# Patient Record
Sex: MALE | Race: WHITE | URBAN - METROPOLITAN AREA
[De-identification: names, ages, dates, MRNs, and addresses within clinical notes are randomized per-mention and may not be internally consistent; named-entity substitution may affect disease eponyms.]

---

## 2022-08-16 ENCOUNTER — VIRTUAL VISIT (OUTPATIENT)
Dept: FAMILY MEDICINE CLINIC | Age: 22
End: 2022-08-16

## 2022-08-16 DIAGNOSIS — Z00.00 ENCOUNTER FOR MEDICAL EXAMINATION TO ESTABLISH CARE: Primary | ICD-10-CM

## 2022-08-16 DIAGNOSIS — Z90.89 HISTORY OF TONSILLECTOMY: ICD-10-CM

## 2022-08-16 PROCEDURE — 99203 OFFICE O/P NEW LOW 30 MIN: CPT | Performed by: FAMILY MEDICINE

## 2022-08-16 NOTE — PATIENT INSTRUCTIONS
A Healthy Lifestyle: Care Instructions  Your Care Instructions     A healthy lifestyle can help you feel good, stay at a healthy weight, and have plenty of energy for both work and play. A healthy lifestyle is something you can share with your whole family. A healthy lifestyle also can lower your risk for serious health problems, such as high blood pressure, heart disease, and diabetes. You can follow a few steps listed below to improve your health and the health of your family. Follow-up care is a key part of your treatment and safety. Be sure to make and go to all appointments, and call your doctor if you are having problems. It's also a good idea to know your test results and keep a list of the medicines you take. How can you care for yourself at home? Do not eat too much sugar, fat, or fast foods. You can still have dessert and treats now and then. The goal is moderation. Start small to improve your eating habits. Pay attention to portion sizes, drink less juice and soda pop, and eat more fruits and vegetables. Eat a healthy amount of food. A 3-ounce serving of meat, for example, is about the size of a deck of cards. Fill the rest of your plate with vegetables and whole grains. Limit the amount of soda and sports drinks you have every day. Drink more water when you are thirsty. Eat plenty of fruits and vegetables every day. Have an apple or some carrot sticks as an afternoon snack instead of a candy bar. Try to have fruits and/or vegetables at every meal.  Make exercise part of your daily routine. You may want to start with simple activities, such as walking, bicycling, or slow swimming. Try to be active 30 to 60 minutes every day. You do not need to do all 30 to 60 minutes all at once. For example, you can exercise 3 times a day for 10 or 20 minutes. Moderate exercise is safe for most people, but it is always a good idea to talk to your doctor before starting an exercise program.  Keep moving.  Philip Major the lawn, work in the garden, or clean your house. Take the stairs instead of the elevator at work. If you smoke, quit. People who smoke have an increased risk for heart attack, stroke, cancer, and other lung illnesses. Quitting is hard, but there are ways to boost your chance of quitting tobacco for good. Use nicotine gum, patches, or lozenges. Ask your doctor about stop-smoking programs and medicines. Keep trying. In addition to reducing your risk of diseases in the future, you will notice some benefits soon after you stop using tobacco. If you have shortness of breath or asthma symptoms, they will likely get better within a few weeks after you quit. Limit how much alcohol you drink. Moderate amounts of alcohol (up to 2 drinks a day for men, 1 drink a day for women) are okay. But drinking too much can lead to liver problems, high blood pressure, and other health problems. Family health  If you have a family, there are many things you can do together to improve your health. Eat meals together as a family as often as possible. Eat healthy foods. This includes fruits, vegetables, lean meats and dairy, and whole grains. Include your family in your fitness plan. Most people think of activities such as jogging or tennis as the way to fitness, but there are many ways you and your family can be more active. Anything that makes you breathe hard and gets your heart pumping is exercise. Here are some tips:  Walk to do errands or to take your child to school or the bus. Go for a family bike ride after dinner instead of watching TV. Where can you learn more? Go to http://www.gray.com/  Enter H974 in the search box to learn more about \"A Healthy Lifestyle: Care Instructions. \"  Current as of: June 16, 2021               Content Version: 13.2  © 5890-5097 [employer].    Care instructions adapted under license by [employer] (which disclaims liability or warranty for this information). If you have questions about a medical condition or this instruction, always ask your healthcare professional. [place] [address] any warranty or liability for your use of this information.

## 2022-09-12 ENCOUNTER — TELEPHONE (OUTPATIENT)
Dept: FAMILY MEDICINE CLINIC | Age: 22
End: 2022-09-12

## 2022-09-12 NOTE — TELEPHONE ENCOUNTER
----- Message from Fadi Perez sent at 9/12/2022  1:01 PM EDT -----  Subject: Appointment Request    Reason for Call: Established Patient Appointment needed: Routine Physical   Exam    QUESTIONS    Reason for appointment request? No appointments available during search     Additional Information for Provider? Requesting appt for Yearly Phys.    Please call patient to schedule,.   ---------------------------------------------------------------------------  --------------  [facility] INFO  [phone]; OK to leave message on voicemail  ---------------------------------------------------------------------------  --------------  SCRIPT ANSWERS  COVID Screen: Tila Sheth

## 2022-09-12 NOTE — TELEPHONE ENCOUNTER
Returned pts call, was going to offer cpe appt on Nov 16 @ 8 am w/ Dr. Pilar Villatoro. Unable to lvm due to vm full.

## 2023-01-13 NOTE — PROGRESS NOTES
HISTORY OF PRESENT ILLNESS    Emmanuel Restrepo is a 25 y.o. male is a new patient with cc of penile lesion. He is schedule for penile biopsy. small nonhealing lesion. Will remove in office and bx.it then. Lesion is asymptomatic, no hx std. IPSS  Score: 1    Past Medical History:  PMHx (including negatives):  has no past medical history on file. PSurgHx:  has no past surgical history on file. PSocHx:  reports that he has never smoked. He has never used smokeless tobacco. He reports that he does not currently use alcohol. He reports that he does not currently use drugs after having used the following drugs: Marijuana. Home Medications    Not on File        Chronic Conditions Addressed Today       1. Varicocele - Primary       Review of Systems   Constitutional: Negative. HENT: Negative. Eyes: Negative. Respiratory: Negative. Cardiovascular: Negative. Gastrointestinal: Negative. Genitourinary: Negative. Musculoskeletal: Negative. Skin: Negative. Neurological: Negative. Endo/Heme/Allergies: Negative. Psychiatric/Behavioral: Negative. Patient denies the symptoms of COVID-19 per routine screening guidelines. Physical Exam  General: pleasant, no distress, good eye contact  HEENT: no pallor, no periorbital edema, EOMI  Neck: supple, no thyromegaly, no nodules  Cardiovascular: regular, normal rate, normal S1 and S2,  Respiratory: clear to auscultation bilaterally     Musculoskeletal:  no acanthosis nigricans,no edema,   Neurological:alert and oriented  Psychiatric: normal mood and affect  Skin: color, texture, turgor normal.   Small lesion on penis, will bx and remove  ASSESSMENT and PLAN  Diagnoses and all orders for this visit:    1.  Penile mass  -     AMB POC URINALYSIS DIP STICK AUTO W/O MICRO         for bx and removal, he is aware of risks of bleeding, infection, injury to penis, he has no questions      Emmanuel Restrepo may have a reminder for a \"due or due soon\" health maintenance. The patient has been encouraged to contact their primary care provider for follow-up on this health maintenance or other necessary and/or routine health screening.      Tomer Negrete MD

## 2023-01-16 ENCOUNTER — OFFICE VISIT (OUTPATIENT)
Dept: UROLOGY | Age: 23
End: 2023-01-16
Payer: MEDICAID

## 2023-01-16 VITALS
BODY MASS INDEX: 40.09 KG/M2 | HEIGHT: 70 IN | RESPIRATION RATE: 16 BRPM | WEIGHT: 280 LBS | DIASTOLIC BLOOD PRESSURE: 83 MMHG | SYSTOLIC BLOOD PRESSURE: 130 MMHG | TEMPERATURE: 98 F | OXYGEN SATURATION: 99 % | HEART RATE: 90 BPM

## 2023-01-16 DIAGNOSIS — N48.89 PENILE MASS: Primary | ICD-10-CM

## 2023-01-16 PROBLEM — I86.1 VARICOCELE: Status: ACTIVE | Noted: 2023-01-16

## 2023-01-16 LAB
BILIRUB UR QL: NEGATIVE
GLUCOSE UR-MCNC: NEGATIVE MG/DL
KETONES P FAST UR STRIP-MCNC: NEGATIVE MG/DL
PH UR STRIP: 8 [PH] (ref 4.6–8)
PROT UR QL STRIP: NEGATIVE
SP GR UR STRIP: 1.02 (ref 1–1.03)
UA UROBILINOGEN AMB POC: NORMAL (ref 0.2–1)
URINALYSIS CLARITY POC: CLEAR
URINALYSIS COLOR POC: YELLOW
URINE BLOOD POC: NEGATIVE
URINE LEUKOCYTES POC: NEGATIVE
URINE NITRITES POC: NEGATIVE

## 2023-01-16 PROCEDURE — 81003 URINALYSIS AUTO W/O SCOPE: CPT | Performed by: UROLOGY

## 2023-01-16 PROCEDURE — 99204 OFFICE O/P NEW MOD 45 MIN: CPT | Performed by: UROLOGY

## 2023-01-16 NOTE — PROGRESS NOTES
Chief Complaint   Patient presents with    New Patient    Pelvic Pain       PHQ-9 score is    Negative    Vitals:    01/16/23 1147   BP: 130/83   Pulse: 90   Resp: 16   Temp: 98 °F (36.7 °C)   SpO2: 99%   Weight: 280 lb (127 kg)   Height: 5' 10\" (1.778 m)        1. \"Have you been to the ER, urgent care clinic since your last visit? Hospitalized since your last visit? \" No    2. \"Have you seen or consulted any other health care providers outside of the [address] [place] [address] since your last visit? \" No     3. For patients aged 39-70: Has the patient had a colonoscopy / FIT/ Cologuard? NA - based on age      If the patient is female:    4. For patients aged 41-77: Has the patient had a mammogram within the past 2 years? NA - based on age or sex      11. For patients aged 21-65: Has the patient had a pap smear?  NA - based on age or sex

## 2023-02-18 PROBLEM — N48.9 PENILE LESION: Status: ACTIVE | Noted: 2023-02-18

## 2023-02-28 NOTE — PROGRESS NOTES
HISTORY OF PRESENT ILLNESS    Eagle Richards is a 21 y.o. male is here for penile biopsy due to small nonhealing lesion. Lesion was asymptomatic, no hx std. Past Medical History:  PMHx (including negatives):  has a past medical history of Sleep apnea. PSurgHx:  has a past surgical history that includes hx heent. PSocHx:  reports that he has never smoked. He has never used smokeless tobacco. He reports current alcohol use. He reports current drug use. Drug: Marijuana. Home Medications    Medication Sig Start Date End Date Taking? Authorizing Provider   ketorolac (TORADOL) 10 mg tablet Take 1 Tablet by mouth every six (6) hours as needed for Pain. Patient not taking: Reported on 5/4/2023 4/13/23   Karon Benitez MD   lidocaine HCl (RX AMB LIDOCAINE 2 % METHOCEL JELLY COMPOUND) Lidocaine 2% Methocel Jelly apply to lasered skin area q3 hours prn pain  Patient not taking: Reported on 5/4/2023 4/13/23   Karon Benitez MD   Cetirizine (ZyrTEC) 10 mg cap Take  by mouth. Provider, Historical        Chronic Conditions Addressed Today       1. Penile lesion - Primary     Relevant Orders     SURGICAL PATHOLOGY (Completed)       Review of Systems   Constitutional: Negative. HENT: Negative. Eyes: Negative. Respiratory: Negative. Cardiovascular: Negative. Gastrointestinal: Negative. Genitourinary: Negative. Musculoskeletal: Negative. Skin: Negative. Neurological: Negative. Endo/Heme/Allergies: Negative. Psychiatric/Behavioral: Negative. Patient denies the symptoms of COVID-19 per routine screening guidelines. Physical Exam    ASSESSMENT and PLAN  Diagnoses and all orders for this visit:    1. Penile lesion  -     SURGICAL PATHOLOGY                 Eagle Richards may have a reminder for a \"due or due soon\" health maintenance.  The patient has been encouraged to contact their primary care provider for follow-up on this health maintenance or other necessary and/or routine health screening.      Lake Gonzalez MD

## 2023-03-02 ENCOUNTER — OFFICE VISIT (OUTPATIENT)
Dept: UROLOGY | Age: 23
End: 2023-03-02
Payer: MEDICAID

## 2023-03-02 VITALS
HEIGHT: 70 IN | OXYGEN SATURATION: 100 % | WEIGHT: 280 LBS | TEMPERATURE: 97.8 F | DIASTOLIC BLOOD PRESSURE: 83 MMHG | HEART RATE: 77 BPM | BODY MASS INDEX: 40.09 KG/M2 | SYSTOLIC BLOOD PRESSURE: 130 MMHG

## 2023-03-02 DIAGNOSIS — N48.9 PENILE LESION: Primary | ICD-10-CM

## 2023-03-02 PROCEDURE — 99212 OFFICE O/P EST SF 10 MIN: CPT | Performed by: UROLOGY

## 2023-03-06 LAB
CPT BILLING CODE: NORMAL
DIAGNOSIS SYNOPSIS:: NORMAL
DX ICD CODE: NORMAL
DX ICD CODE: NORMAL
PATH REPORT.COMMENTS IMP SPEC: NORMAL
PATH REPORT.FINAL DX SPEC: NORMAL
PATH REPORT.GROSS SPEC: NORMAL
PATH REPORT.MICROSCOPIC SPEC OTHER STN: NORMAL
PATH REPORT.RELEVANT HX SPEC: NORMAL
PATH REPORT.SITE OF ORIGIN SPEC: NORMAL
PATHOLOGIST NAME: NORMAL
PAYMENT PROCEDURE: NORMAL

## 2023-03-08 ENCOUNTER — TELEPHONE (OUTPATIENT)
Dept: UROLOGY | Age: 23
End: 2023-03-08

## 2023-03-08 NOTE — TELEPHONE ENCOUNTER
Patient called asking for his biposy results, says he cannot login in to [place] because it is asking for a activation code, offered to give that to the patient but he did not want to continue with the login process. Patient asked me to give results over the phone during our phone conversation, explained the results needed to be reviewed by the provider first and then we may need to schedule a follow up visit- Patient asked if this can be done today, I stated I am sending a message over, however Esmer Holman is seeing patient's this afternoon. Patient asked me again for me to give the results, I explained I could not give results as I need to send a message to the clinical staff prior.     Patient states Dr. Esmer Holman told him we can just call him with the results-   Please advise

## 2023-04-11 PROBLEM — L73.8: Status: ACTIVE | Noted: 2023-04-11

## 2023-04-11 RX ORDER — CETIRIZINE HYDROCHLORIDE 10 MG/1
CAPSULE, LIQUID FILLED ORAL
COMMUNITY

## 2023-04-13 ENCOUNTER — HOSPITAL ENCOUNTER (OUTPATIENT)
Age: 23
Discharge: HOME | End: 2023-04-13
Attending: UROLOGY | Admitting: UROLOGY
Payer: MEDICAID

## 2023-04-13 VITALS
RESPIRATION RATE: 16 BRPM | BODY MASS INDEX: 39.37 KG/M2 | SYSTOLIC BLOOD PRESSURE: 105 MMHG | HEART RATE: 76 BPM | WEIGHT: 275 LBS | DIASTOLIC BLOOD PRESSURE: 59 MMHG | TEMPERATURE: 97.5 F | OXYGEN SATURATION: 95 % | HEIGHT: 70 IN

## 2023-04-13 DIAGNOSIS — L73.8: Primary | ICD-10-CM

## 2023-04-13 PROBLEM — N48.9: Status: ACTIVE | Noted: 2023-04-13

## 2023-04-13 PROCEDURE — 74011000250 HC RX REV CODE- 250: Performed by: UROLOGY

## 2023-04-13 PROCEDURE — 17111 DESTRUCTION B9 LESIONS 15/>: CPT | Performed by: UROLOGY

## 2023-04-13 PROCEDURE — 76010000160 HC OR TIME 0.5 TO 1 HR INTENSV-TIER 1: Performed by: UROLOGY

## 2023-04-13 PROCEDURE — 74011250636 HC RX REV CODE- 250/636: Performed by: UROLOGY

## 2023-04-13 PROCEDURE — 77030040361 HC SLV COMPR DVT MDII -B: Performed by: UROLOGY

## 2023-04-13 PROCEDURE — 77030039465 HC PRB ASPIR SONICVAC MSNX -F: Performed by: UROLOGY

## 2023-04-13 PROCEDURE — 2709999900 HC NON-CHARGEABLE SUPPLY: Performed by: UROLOGY

## 2023-04-13 PROCEDURE — 77030020268 HC MISC GENERAL SUPPLY: Performed by: UROLOGY

## 2023-04-13 PROCEDURE — 76060000032 HC ANESTHESIA 0.5 TO 1 HR: Performed by: UROLOGY

## 2023-04-13 PROCEDURE — 76210000020 HC REC RM PH II FIRST 0.5 HR: Performed by: UROLOGY

## 2023-04-13 PROCEDURE — 76210000063 HC OR PH I REC FIRST 0.5 HR: Performed by: UROLOGY

## 2023-04-13 RX ORDER — SODIUM CHLORIDE, SODIUM LACTATE, POTASSIUM CHLORIDE, CALCIUM CHLORIDE 600; 310; 30; 20 MG/100ML; MG/100ML; MG/100ML; MG/100ML
20 INJECTION, SOLUTION INTRAVENOUS CONTINUOUS
Status: DISCONTINUED | OUTPATIENT
Start: 2023-04-13 | End: 2023-04-13 | Stop reason: HOSPADM

## 2023-04-13 RX ORDER — KETOROLAC TROMETHAMINE 10 MG/1
10 TABLET, FILM COATED ORAL
Qty: 20 TABLET | Refills: 0 | Status: SHIPPED | OUTPATIENT
Start: 2023-04-13

## 2023-04-13 RX ORDER — BUPIVACAINE HYDROCHLORIDE 2.5 MG/ML
INJECTION, SOLUTION EPIDURAL; INFILTRATION; INTRACAUDAL AS NEEDED
Status: DISCONTINUED | OUTPATIENT
Start: 2023-04-13 | End: 2023-04-13 | Stop reason: HOSPADM

## 2023-04-13 RX ORDER — BACITRACIN ZINC 500 UNIT/G
OINTMENT IN PACKET (EA) TOPICAL AS NEEDED
Status: DISCONTINUED | OUTPATIENT
Start: 2023-04-13 | End: 2023-04-13 | Stop reason: HOSPADM

## 2023-04-13 RX ADMIN — SODIUM CHLORIDE, POTASSIUM CHLORIDE, SODIUM LACTATE AND CALCIUM CHLORIDE 20 ML/HR: 600; 310; 30; 20 INJECTION, SOLUTION INTRAVENOUS at 09:23

## 2023-04-13 NOTE — PROGRESS NOTES
Family updated at 11:39 AM by Marietta Young, RN  Pts friends phone number is not on chart and pt doesn't know it by heart, will call when hes ready to go. No

## 2023-04-13 NOTE — PROGRESS NOTES
Patient to [facility] recovery. Drink given. Penile dressing clean dry intact. Call bell within reach, will continue to monitor.

## 2023-04-13 NOTE — PROGRESS NOTES
IV removed-- tip intact, no redness, swelling or bleeding noted. VSS. Patient in no acute distress. DC instructions reviewed with patient only per request, friend signed for discharge      -- verbalized understanding. Patient wheeled out to private vehicle-- no distress noted.

## 2023-04-13 NOTE — DISCHARGE INSTRUCTIONS
May shower tomorrow 4/14/23  Toradol given at 11 AM. Next dose at 5 PM if needed. Toradol is an NSAID. Do not take any other NSAID medication until this medication is complete. Tylenol may be taken.

## 2023-04-13 NOTE — OP NOTES
Laser to more than 50 lesions on penis and scrotum  Preop diagnosis multiple hyperplastic lesions on penis and scrotum benign  Postop-Diagnosis same  Surgeon-Estiven  Anesthesia-MAC with local  Complication without  EBL less than 5 cc  Assistant-Xu  Indication-patient has multiple hyperplastic lesions on his penis and the base of his penis all way around about one third of the penis. Also on his scrotum and surrounding area. They have been biopsied to be benign hyperplastic lesions he wants these removed. He is aware of the risk of bleeding infection injury to the skin the penis he may form more lesions later. He is aware of alternatives he has no questions  Procedure-patient was prepped and draped in the usual fashion appendectomy and placed lithotomy position and undergoing MAC anesthesia. SCDs were applied antibiotics were given timeout was performed  Patient had the base of his penis infiltrated with quarter percent Marcaine without epinephrine for penile block. Then using the Protouch laser at a very low setting superpulse of 5 W at 10 Hz total energy used was 945 J time was 3 minutes 9 seconds. I vaporized over 50 lesions probably some 1/3/2015 100 small pinpoint lesions all with essentially 1 mm or less.   It in the procedure look very good no bleeding I put bacitracin over the base of the penis around the lesions in the scrotal area which I popped all these hyperplastic lesions and then put dressings on took him off the table to recovery area without complication

## 2023-05-04 ENCOUNTER — OFFICE VISIT (OUTPATIENT)
Dept: UROLOGY | Age: 23
End: 2023-05-04
Payer: MEDICAID

## 2023-05-04 VITALS — HEART RATE: 108 BPM | DIASTOLIC BLOOD PRESSURE: 84 MMHG | SYSTOLIC BLOOD PRESSURE: 126 MMHG | TEMPERATURE: 98.5 F

## 2023-05-04 DIAGNOSIS — L73.8: ICD-10-CM

## 2023-05-04 DIAGNOSIS — N48.9 PENILE LESION: Primary | ICD-10-CM

## 2023-05-04 DIAGNOSIS — N48.9 DISEASE OF PENIS: ICD-10-CM

## 2023-05-04 LAB
BILIRUB UR QL: NEGATIVE
GLUCOSE UR-MCNC: NEGATIVE MG/DL
KETONES P FAST UR STRIP-MCNC: NORMAL MG/DL
PH UR STRIP: 6 [PH] (ref 4.6–8)
PROT UR QL STRIP: NEGATIVE
SP GR UR STRIP: 1.02 (ref 1–1.03)
UA UROBILINOGEN AMB POC: NORMAL (ref 0.2–1)
URINALYSIS CLARITY POC: CLEAR
URINALYSIS COLOR POC: YELLOW
URINE BLOOD POC: NEGATIVE
URINE LEUKOCYTES POC: NEGATIVE
URINE NITRITES POC: NEGATIVE

## 2023-05-04 PROCEDURE — 99212 OFFICE O/P EST SF 10 MIN: CPT | Performed by: UROLOGY

## 2023-05-04 PROCEDURE — 81003 URINALYSIS AUTO W/O SCOPE: CPT | Performed by: UROLOGY

## 2023-05-22 RX ORDER — CETIRIZINE HYDROCHLORIDE 10 MG/1
CAPSULE, LIQUID FILLED ORAL
COMMUNITY

## 2023-05-22 RX ORDER — KETOROLAC TROMETHAMINE 10 MG/1
10 TABLET, FILM COATED ORAL EVERY 6 HOURS PRN
COMMUNITY
Start: 2023-04-13

## 2024-04-01 NOTE — RT PROTOCOL NOTE
Pt  request  to  call friend  Matthias Mortimer when ready , DO NOT Text or  go  over instructions  with  friend  pt  states will sign own paperwork
independent

## (undated) DEVICE — GARMENT,MEDLINE,DVT,INT,CALF,MED, GEN2: Brand: MEDLINE

## (undated) DEVICE — GLOVE ORANGE PI 7 1/2   MSG9075

## (undated) DEVICE — SOUTHSIDE TURNOVER: Brand: MEDLINE INDUSTRIES, INC.

## (undated) DEVICE — TUBING, SUCTION, 1/4" X 10', STRAIGHT: Brand: MEDLINE

## (undated) DEVICE — YANKAUER,BULB TIP,W/O VENT,RIGID,STERILE: Brand: MEDLINE

## (undated) DEVICE — SHARPVAC DEBRIDEMENT PROBE WITH ASPIRATION: Brand: SONICONE O.R.

## (undated) DEVICE — SUPPORT ATHLETIC SCROTAL

## (undated) DEVICE — MINOR GENERAL PACK: Brand: MEDLINE INDUSTRIES, INC.

## (undated) DEVICE — INTENDED FOR TISSUE SEPARATION, AND OTHER PROCEDURES THAT REQUIRE A SHARP SURGICAL BLADE TO PUNCTURE OR CUT.: Brand: BARD-PARKER SAFETY BLADES SIZE 15, STERILE

## (undated) DEVICE — 600 MICRON LASER FIBER

## (undated) DEVICE — SYR 10ML LUER LOK 1/5ML GRAD --